# Patient Record
Sex: FEMALE | Race: WHITE | NOT HISPANIC OR LATINO | Employment: UNEMPLOYED | URBAN - METROPOLITAN AREA
[De-identification: names, ages, dates, MRNs, and addresses within clinical notes are randomized per-mention and may not be internally consistent; named-entity substitution may affect disease eponyms.]

---

## 2024-01-23 ENCOUNTER — OFFICE VISIT (OUTPATIENT)
Dept: ENDOCRINOLOGY | Facility: CLINIC | Age: 49
End: 2024-01-23
Payer: COMMERCIAL

## 2024-01-23 VITALS
DIASTOLIC BLOOD PRESSURE: 64 MMHG | SYSTOLIC BLOOD PRESSURE: 110 MMHG | WEIGHT: 141 LBS | HEART RATE: 52 BPM | OXYGEN SATURATION: 99 %

## 2024-01-23 DIAGNOSIS — E04.2 MULTIPLE THYROID NODULES: ICD-10-CM

## 2024-01-23 DIAGNOSIS — E03.9 ACQUIRED HYPOTHYROIDISM: Primary | ICD-10-CM

## 2024-01-23 PROCEDURE — 99204 OFFICE O/P NEW MOD 45 MIN: CPT | Performed by: INTERNAL MEDICINE

## 2024-01-23 PROCEDURE — 1159F MED LIST DOCD IN RCRD: CPT | Performed by: INTERNAL MEDICINE

## 2024-01-23 PROCEDURE — 1160F RVW MEDS BY RX/DR IN RCRD: CPT | Performed by: INTERNAL MEDICINE

## 2024-01-23 RX ORDER — LEVOTHYROXINE SODIUM 0.05 MG/1
TABLET ORAL DAILY
COMMUNITY
Start: 2024-01-13

## 2024-01-23 RX ORDER — PAROXETINE HYDROCHLORIDE 20 MG/1
TABLET, FILM COATED ORAL EVERY MORNING
COMMUNITY
Start: 2024-01-18

## 2024-01-23 NOTE — PROGRESS NOTES
Chief Complaint   Patient presents with    Thyroid Problem        Referring Provider  Nadege Jama,*     HPI   Rima Canseco is a 48 y.o. female had concerns including Thyroid Problem.     New patient referred for hypothyroidism and thyroid nodules.  She is on levothyroxine 50 mcg daily.  Started about three years ago.   Taking the medication first thing in the morning, an hour before other meds, no missed doses.   She had an ultrasound checked in August 2023 showing bilateral subcentimeter thyroid nodules. Repeat US pending next month.    TPO antibody negative.  TSH has been normal.  She feels well.    PCP rechecked labs a few weeks ago including thyroid. Was told her LFTs were elevated - 2 times the upper limit.       Past Medical History:   Diagnosis Date    Hypothyroidism      Past Surgical History:   Procedure Laterality Date    D & C AND LAPAROSCOPY      HYSTERECTOMY      TUBAL ABDOMINAL LIGATION        Family History   Problem Relation Age of Onset    Thyroid disease Mother     Heart disease Mother     Liver disease Father       Social History     Socioeconomic History    Marital status:    Tobacco Use    Smoking status: Never    Smokeless tobacco: Never   Vaping Use    Vaping Use: Never used   Substance and Sexual Activity    Alcohol use: Never    Drug use: Never    Sexual activity: Defer      Allergies   Allergen Reactions    Acetaminophen Hives      Current Outpatient Medications on File Prior to Visit   Medication Sig Dispense Refill    levothyroxine (SYNTHROID, LEVOTHROID) 50 MCG tablet Daily.      PARoxetine (PAXIL) 20 MG tablet Every Morning.       No current facility-administered medications on file prior to visit.        Review of Systems   Constitutional: Negative.    HENT: Negative.     Eyes: Negative.    Respiratory: Negative.     Cardiovascular: Negative.    Gastrointestinal: Negative.    Endocrine: Negative.    Genitourinary: Negative.    Musculoskeletal: Negative.    Skin:  Negative.    Allergic/Immunologic: Negative.    Neurological:  Positive for dizziness.   Hematological: Negative.    Psychiatric/Behavioral: Negative.          /64   Pulse 52   Wt 64 kg (141 lb)   SpO2 99%      Physical Exam    Constitutional:  well developed; well nourished  no acute distress   ENT/Thyroid: no thyromegaly  no palpable nodules   Eyes: EOM intact  Conjunctiva: clear   Respiratory:  breathing is unlabored  clear to auscultation bilaterally   Cardiovascular:  regular rate and rhythm, S1, S2 normal, no murmur, click, rub or gallop   Chest:  Not performed.   Abdomen: Not performed.   : Not performed.   Musculoskeletal: negative findings:  ROM of all joints is normal, no deformities present   Skin: dry and warm   Neuro: normal without focal findings and mental status, speech normal, alert and oriented x3   Psych: oriented to time, place and person, mood and affect are within normal limits     Labs/Imaging    Thyroid ultrasound 8/23/2023 right lobe measuring 4.6 x 1.1 x 1.5 cm with a small hypoechoic lower pole nodule measuring 3.5 mm, left lobe measuring 4.3 x 0.9 x 1.3 cm with a 5 mm upper lobe nodule, recommended repeat ultrasound in 6 to 12 months    9/1/2023 Free T4 1.32, TSH 3.34, TPO antibody less than 9    8/24/2023 A1c 5.4, TSH 1.52, total cholesterol 165, LDL 89, HDL 66, triglycerides 45        Assessment and Plan    Diagnoses and all orders for this visit:    1. Acquired hypothyroidism (Primary)  Diagnosed 3 years ago. On levothyroxine 50 mcg daily, taking as directed. Is clinically and biochemically euthyroid. Labs checked by PCP two weeks ago. I don't have those results.   Monitor yearly. Continue levothyroxine.     2. Multiple thyroid nodules  US report 8/23/23 reviewed. She has bilateral subcentimeter nodules. If US stable next month, can repeat in a year. If stable, subcm nodules, no routing US monitoring is needed.   Thyroid is normal on exam.        Return if symptoms worsen or  fail to improve. The patient was instructed to contact the clinic with any interval questions or concerns.    Danya Santana, DO   Endocrinologist    Please note that portions of this note were completed with a voice recognition program.